# Patient Record
Sex: MALE | URBAN - METROPOLITAN AREA
[De-identification: names, ages, dates, MRNs, and addresses within clinical notes are randomized per-mention and may not be internally consistent; named-entity substitution may affect disease eponyms.]

---

## 2018-01-25 ENCOUNTER — IMPORTED ENCOUNTER (OUTPATIENT)
Dept: URBAN - METROPOLITAN AREA CLINIC 43 | Facility: CLINIC | Age: 68
End: 2018-01-25

## 2018-01-25 PROBLEM — H43.813: Noted: 2018-01-25

## 2018-01-25 PROBLEM — H25.013: Noted: 2018-01-25

## 2018-01-25 PROBLEM — H02.834: Noted: 2018-01-25

## 2018-01-25 PROBLEM — H25.13: Noted: 2018-01-25

## 2018-01-25 PROBLEM — H35.362: Noted: 2018-01-25

## 2018-01-25 PROBLEM — H02.831: Noted: 2018-01-25

## 2019-01-30 ENCOUNTER — IMPORTED ENCOUNTER (OUTPATIENT)
Dept: URBAN - METROPOLITAN AREA CLINIC 43 | Facility: CLINIC | Age: 69
End: 2019-01-30

## 2019-01-30 PROBLEM — H35.363: Noted: 2019-01-30

## 2019-01-30 PROBLEM — H25.013: Noted: 2019-01-30

## 2019-01-30 PROBLEM — H25.13: Noted: 2019-01-30

## 2019-01-30 PROBLEM — H16.223: Noted: 2019-01-30

## 2019-01-30 PROBLEM — H43.813: Noted: 2019-01-30

## 2020-01-30 ENCOUNTER — ESTABLISHED COMPREHENSIVE EXAM (OUTPATIENT)
Dept: URBAN - METROPOLITAN AREA CLINIC 32 | Facility: CLINIC | Age: 70
End: 2020-01-30

## 2020-01-30 ENCOUNTER — PREPPED CHART (OUTPATIENT)
Dept: URBAN - METROPOLITAN AREA CLINIC 32 | Facility: CLINIC | Age: 70
End: 2020-01-30

## 2020-01-30 DIAGNOSIS — H35.363: ICD-10-CM

## 2020-01-30 DIAGNOSIS — H16.223: ICD-10-CM

## 2020-01-30 DIAGNOSIS — H25.013: ICD-10-CM

## 2020-01-30 DIAGNOSIS — B07.9: ICD-10-CM

## 2020-01-30 PROCEDURE — 92014 COMPRE OPH EXAM EST PT 1/>: CPT

## 2020-01-30 PROCEDURE — 92134 CPTRZ OPH DX IMG PST SGM RTA: CPT

## 2020-01-30 ASSESSMENT — KERATOMETRY
OD_K2POWER_DIOPTERS: 41.5
OD_AXISANGLE2_DEGREES: 100
OS_AXISANGLE2_DEGREES: 179
OS_AXISANGLE_DEGREES: 89
OD_AXISANGLE_DEGREES: 10
OS_K1POWER_DIOPTERS: 47.25
OS_K2POWER_DIOPTERS: 46
OD_K1POWER_DIOPTERS: 45.25

## 2020-01-30 ASSESSMENT — VISUAL ACUITY
OD_SC: 20/60-1
OS_SC: J1
OS_CC: 20/25
OD_CC: 20/30+1
OS_SC: 20/60
OD_SC: J1

## 2020-01-30 ASSESSMENT — TONOMETRY
OS_IOP_MMHG: 13
OD_IOP_MMHG: 13

## 2020-04-19 ASSESSMENT — VISUAL ACUITY
OS_SC: J1
OS_CC: 20/30-2
OD_OTHER: 20/40.
OS_CC: J1+ SC
OD_SC: 20/50-
OD_CC: J1+ SC
OD_CC: 20/25-
OD_SC: J1
OS_SC: J1
OS_SC: 20/40
OD_SC: 20/40
OS_OTHER: 20/40.
OD_SC: J1
OD_CC: 20/20
OS_CC: 20/20
OS_SC: 20/60-

## 2020-04-19 ASSESSMENT — TONOMETRY
OD_IOP_MMHG: 15.0
OS_IOP_MMHG: 16.0
OD_IOP_MMHG: 12.0
OS_IOP_MMHG: 13.0

## 2020-04-19 ASSESSMENT — KERATOMETRY
OD_AXISANGLE_DEGREES: 80
OS_AXISANGLE_DEGREES: 85
OS_AXISANGLE2_DEGREES: 175
OD_K1POWER_DIOPTERS: 46.75
OD_K2POWER_DIOPTERS: 46
OD_AXISANGLE2_DEGREES: 170
OS_K2POWER_DIOPTERS: 46
OS_K1POWER_DIOPTERS: 46.75

## 2021-01-01 ENCOUNTER — LAB OUTSIDE AN ENCOUNTER (OUTPATIENT)
Dept: URBAN - METROPOLITAN AREA CLINIC 68 | Facility: CLINIC | Age: 71
End: 2021-01-01

## 2021-06-23 ENCOUNTER — OFFICE VISIT (OUTPATIENT)
Dept: URBAN - METROPOLITAN AREA CLINIC 68 | Facility: CLINIC | Age: 71
End: 2021-06-23

## 2021-08-16 NOTE — PATIENT DISCUSSION
Patients likes trial frame of todays MR compared to new and old lenses. New Rx give for glasses to be remade.

## 2022-06-04 ENCOUNTER — TELEPHONE ENCOUNTER (OUTPATIENT)
Dept: URBAN - METROPOLITAN AREA CLINIC 68 | Facility: CLINIC | Age: 72
End: 2022-06-04

## 2022-06-05 ENCOUNTER — TELEPHONE ENCOUNTER (OUTPATIENT)
Dept: URBAN - METROPOLITAN AREA CLINIC 68 | Facility: CLINIC | Age: 72
End: 2022-06-05

## 2022-06-05 RX ORDER — AMLODIPINE BESYLATE 5 MG/1
AMLODIPINE BESYLATE( 5MG ORAL   ) ACTIVE -HX ENTRY TABLET ORAL
Status: ACTIVE | COMMUNITY
Start: 2021-06-23

## 2022-06-05 RX ORDER — LISINOPRIL 40 MG/1
LISINOPRIL( 40MG ORAL   ) ACTIVE -HX ENTRY TABLET ORAL
Status: ACTIVE | COMMUNITY
Start: 2021-06-23

## 2022-06-05 RX ORDER — ALLOPURINOL 100 MG/1
ALLOPURINOL( 100MG ORAL 2   ) ACTIVE -HX ENTRY TABLET ORAL
Status: ACTIVE | COMMUNITY
Start: 2021-06-23

## 2022-06-25 ENCOUNTER — TELEPHONE ENCOUNTER (OUTPATIENT)
Age: 72
End: 2022-06-25

## 2022-06-26 ENCOUNTER — TELEPHONE ENCOUNTER (OUTPATIENT)
Age: 72
End: 2022-06-26

## 2022-06-26 RX ORDER — LISINOPRIL 40 MG/1
LISINOPRIL( 40MG ORAL   ) ACTIVE -HX ENTRY TABLET ORAL
Status: ACTIVE | COMMUNITY
Start: 2021-06-23

## 2022-06-26 RX ORDER — ALLOPURINOL 100 MG/1
ALLOPURINOL( 100MG ORAL 2   ) ACTIVE -HX ENTRY TABLET ORAL
Status: ACTIVE | COMMUNITY
Start: 2021-06-23

## 2022-06-26 RX ORDER — AMLODIPINE BESYLATE 5 MG/1
AMLODIPINE BESYLATE( 5MG ORAL   ) ACTIVE -HX ENTRY TABLET ORAL
Status: ACTIVE | COMMUNITY
Start: 2021-06-23

## 2022-11-15 NOTE — PATIENT DISCUSSION
Symptoms alleviated when blinking, especially at night. Would recommend warm compresses/[place] mask. Mostly evaporative dry eye.  Also recommended artificial tears to use: 1 drop 4x a day in both eyes.

## 2022-12-14 ENCOUNTER — COMPREHENSIVE EXAM (OUTPATIENT)
Dept: URBAN - METROPOLITAN AREA CLINIC 32 | Facility: CLINIC | Age: 72
End: 2022-12-14

## 2022-12-14 PROCEDURE — 92015 DETERMINE REFRACTIVE STATE: CPT

## 2022-12-14 PROCEDURE — 92014 COMPRE OPH EXAM EST PT 1/>: CPT

## 2022-12-14 ASSESSMENT — TONOMETRY
OS_IOP_MMHG: 16
OD_IOP_MMHG: 14

## 2022-12-14 ASSESSMENT — VISUAL ACUITY
OS_CC: 20/100-1
OS_PH: 20/50+1
OD_CC: 20/25-1
OS_SC: 20/300
OD_SC: J1+
OD_SC: 20/30-2
OD_GLARE: 20/60
OS_GLARE: 20/100
OS_SC: J1+

## 2022-12-14 ASSESSMENT — KERATOMETRY
OS_K2POWER_DIOPTERS: 46.25
OS_AXISANGLE2_DEGREES: 87
OD_AXISANGLE2_DEGREES: 82
OD_K2POWER_DIOPTERS: 46.25
OD_K1POWER_DIOPTERS: 47.00
OS_K1POWER_DIOPTERS: 46.75
OD_AXISANGLE_DEGREES: 172
OS_AXISANGLE_DEGREES: 177

## 2023-06-09 ENCOUNTER — FOLLOW UP (OUTPATIENT)
Dept: URBAN - METROPOLITAN AREA CLINIC 32 | Facility: CLINIC | Age: 73
End: 2023-06-09

## 2023-06-09 DIAGNOSIS — H18.422: ICD-10-CM

## 2023-06-09 DIAGNOSIS — H35.3121: ICD-10-CM

## 2023-06-09 DIAGNOSIS — H16.223: ICD-10-CM

## 2023-06-09 DIAGNOSIS — H43.813: ICD-10-CM

## 2023-06-09 DIAGNOSIS — H35.363: ICD-10-CM

## 2023-06-09 DIAGNOSIS — H25.813: ICD-10-CM

## 2023-06-09 PROCEDURE — 92015 DETERMINE REFRACTIVE STATE: CPT

## 2023-06-09 PROCEDURE — 92134 CPTRZ OPH DX IMG PST SGM RTA: CPT

## 2023-06-09 PROCEDURE — 99214 OFFICE O/P EST MOD 30 MIN: CPT

## 2023-06-09 ASSESSMENT — KERATOMETRY
OS_K2POWER_DIOPTERS: 45.75
OD_AXISANGLE2_DEGREES: 180
OS_AXISANGLE2_DEGREES: 81
OD_K2POWER_DIOPTERS: 46.00
OD_AXISANGLE_DEGREES: 090
OD_K1POWER_DIOPTERS: 46.75
OS_AXISANGLE_DEGREES: 171
OS_K1POWER_DIOPTERS: 46.75

## 2023-06-09 ASSESSMENT — VISUAL ACUITY
OD_SC: J3
OS_PH: 20/30
OS_SC: J3
OS_SC: 20/60
OD_SC: 20/60
OD_CC: 20/25-2
OS_CC: 20/40

## 2023-06-09 ASSESSMENT — TONOMETRY
OD_IOP_MMHG: 12
OS_IOP_MMHG: 14

## 2024-06-20 ENCOUNTER — COMPREHENSIVE EXAM (OUTPATIENT)
Dept: URBAN - METROPOLITAN AREA CLINIC 32 | Facility: CLINIC | Age: 74
End: 2024-06-20

## 2024-06-20 DIAGNOSIS — H25.813: ICD-10-CM

## 2024-06-20 DIAGNOSIS — H43.813: ICD-10-CM

## 2024-06-20 DIAGNOSIS — H18.422: ICD-10-CM

## 2024-06-20 DIAGNOSIS — H35.3111: ICD-10-CM

## 2024-06-20 DIAGNOSIS — H16.223: ICD-10-CM

## 2024-06-20 DIAGNOSIS — H35.3122: ICD-10-CM

## 2024-06-20 PROCEDURE — 92015 DETERMINE REFRACTIVE STATE: CPT

## 2024-06-20 PROCEDURE — 92134 CPTRZ OPH DX IMG PST SGM RTA: CPT

## 2024-06-20 PROCEDURE — 99214 OFFICE O/P EST MOD 30 MIN: CPT

## 2024-06-20 ASSESSMENT — VISUAL ACUITY
OS_SC: J1
OD_CC: 20/30-2
OD_SC: 20/60
OD_GLARE: 20/100
OD_SC: J2
OS_GLARE: 20/100
OS_PH: 20/25-2
OS_CC: 20/50
OS_SC: 20/80

## 2024-06-20 ASSESSMENT — KERATOMETRY
OS_K2POWER_DIOPTERS: 45.75
OD_AXISANGLE2_DEGREES: 49
OD_K1POWER_DIOPTERS: 46.75
OD_AXISANGLE_DEGREES: 090
OD_AXISANGLE2_DEGREES: 180
OD_AXISANGLE_DEGREES: 139
OD_K1POWER_DIOPTERS: 47.25
OS_K1POWER_DIOPTERS: 46.75
OD_K2POWER_DIOPTERS: 46.00
OD_K2POWER_DIOPTERS: 47.00
OS_AXISANGLE2_DEGREES: 81
OS_AXISANGLE_DEGREES: 171

## 2024-06-20 ASSESSMENT — TONOMETRY
OD_IOP_MMHG: 12
OS_IOP_MMHG: 15

## 2024-06-26 ENCOUNTER — DIAGNOSTICS ONLY (OUTPATIENT)
Dept: URBAN - METROPOLITAN AREA CLINIC 32 | Facility: CLINIC | Age: 74
End: 2024-06-26

## 2024-06-26 DIAGNOSIS — H57.03: ICD-10-CM

## 2024-06-26 DIAGNOSIS — H25.813: ICD-10-CM

## 2024-06-26 PROCEDURE — 92136 OPHTHALMIC BIOMETRY: CPT

## 2024-06-26 PROCEDURE — 99213 OFFICE O/P EST LOW 20 MIN: CPT

## 2024-06-26 PROCEDURE — V2799PM PRED MOXI

## 2024-06-26 ASSESSMENT — VISUAL ACUITY
OS_SC: 20/80
OD_GLARE: 20/100
OD_CC: J1+
OD_SC: 20/60
OS_CC: J1+
OD_SC: J2
OS_CC: 20/25
OD_CC: 20/20
OS_GLARE: 20/100
OS_SC: J1

## 2024-06-26 ASSESSMENT — KERATOMETRY
OD_K1POWER_DIOPTERS: 46.75
OD_AXISANGLE2_DEGREES: 49
OD_AXISANGLE2_DEGREES: 180
OD_AXISANGLE_DEGREES: 139
OD_K2POWER_DIOPTERS: 46.00
OD_K2POWER_DIOPTERS: 47.00
OS_AXISANGLE2_DEGREES: 81
OS_K2POWER_DIOPTERS: 45.75
OS_AXISANGLE_DEGREES: 171
OD_AXISANGLE_DEGREES: 090
OS_K1POWER_DIOPTERS: 46.75
OD_K1POWER_DIOPTERS: 47.25

## 2024-07-17 ENCOUNTER — SURGERY/PROCEDURE (OUTPATIENT)
Facility: LOCATION | Age: 74
End: 2024-07-17

## 2024-07-17 DIAGNOSIS — H25.812: ICD-10-CM

## 2024-07-17 PROCEDURE — 66984 XCAPSL CTRC RMVL W/O ECP: CPT

## 2024-07-18 ENCOUNTER — POST-OP (OUTPATIENT)
Dept: URBAN - METROPOLITAN AREA CLINIC 32 | Facility: CLINIC | Age: 74
End: 2024-07-18

## 2024-07-18 DIAGNOSIS — Z96.1: ICD-10-CM

## 2024-07-18 PROCEDURE — 99024 POSTOP FOLLOW-UP VISIT: CPT

## 2024-07-18 ASSESSMENT — KERATOMETRY
OD_K1POWER_DIOPTERS: 47.25
OD_K2POWER_DIOPTERS: 47.00
OS_AXISANGLE2_DEGREES: 81
OS_AXISANGLE_DEGREES: 171
OS_K2POWER_DIOPTERS: 45.75
OD_AXISANGLE_DEGREES: 139
OS_K1POWER_DIOPTERS: 46.75
OD_AXISANGLE2_DEGREES: 49

## 2024-07-18 ASSESSMENT — TONOMETRY: OS_IOP_MMHG: 15

## 2024-07-18 ASSESSMENT — VISUAL ACUITY
OD_SC: J2
OD_SC: 20/200

## 2024-07-22 ENCOUNTER — POST-OP (OUTPATIENT)
Dept: URBAN - METROPOLITAN AREA CLINIC 32 | Facility: CLINIC | Age: 74
End: 2024-07-22

## 2024-07-22 DIAGNOSIS — Z96.1: ICD-10-CM

## 2024-07-22 DIAGNOSIS — H57.03: ICD-10-CM

## 2024-07-22 DIAGNOSIS — H25.811: ICD-10-CM

## 2024-07-22 PROCEDURE — 99024 POSTOP FOLLOW-UP VISIT: CPT

## 2024-07-22 ASSESSMENT — VISUAL ACUITY
OS_SC: 20/100
OS_PH: 20/25-1
OS_SC: J1+

## 2024-07-22 ASSESSMENT — KERATOMETRY
OD_AXISANGLE_DEGREES: 139
OD_K1POWER_DIOPTERS: 47.25
OS_K2POWER_DIOPTERS: 45.75
OS_AXISANGLE2_DEGREES: 81
OD_AXISANGLE2_DEGREES: 49
OD_K2POWER_DIOPTERS: 47.00
OS_AXISANGLE_DEGREES: 171
OS_K1POWER_DIOPTERS: 46.75

## 2024-07-22 ASSESSMENT — TONOMETRY
OS_IOP_MMHG: 12
OD_IOP_MMHG: 12

## 2024-07-24 ASSESSMENT — KERATOMETRY
OD_K1POWER_DIOPTERS: 47.25
OD_AXISANGLE2_DEGREES: 180
OS_K2POWER_DIOPTERS: 45.75
OS_AXISANGLE_DEGREES: 171
OD_K1POWER_DIOPTERS: 46.75
OD_K2POWER_DIOPTERS: 46.00
OS_AXISANGLE2_DEGREES: 81
OD_AXISANGLE_DEGREES: 090
OD_AXISANGLE_DEGREES: 139
OD_K2POWER_DIOPTERS: 47.00
OD_AXISANGLE2_DEGREES: 49
OS_K1POWER_DIOPTERS: 46.75

## 2024-08-01 ENCOUNTER — POST-OP (OUTPATIENT)
Dept: URBAN - METROPOLITAN AREA CLINIC 32 | Facility: CLINIC | Age: 74
End: 2024-08-01

## 2024-08-01 DIAGNOSIS — Z96.1: ICD-10-CM

## 2024-08-01 PROCEDURE — 99024 POSTOP FOLLOW-UP VISIT: CPT

## 2024-08-01 ASSESSMENT — KERATOMETRY
OS_K1POWER_DIOPTERS: 46.75
OS_K2POWER_DIOPTERS: 45.75
OD_K1POWER_DIOPTERS: 47.25
OD_AXISANGLE_DEGREES: 139
OD_AXISANGLE2_DEGREES: 49
OS_AXISANGLE2_DEGREES: 81
OD_K2POWER_DIOPTERS: 47.00
OS_AXISANGLE_DEGREES: 171

## 2024-08-01 ASSESSMENT — TONOMETRY: OD_IOP_MMHG: 13

## 2024-08-01 ASSESSMENT — VISUAL ACUITY
OD_PH: 20/30-2
OD_SC: J1+2
OD_SC: 20/80+2

## 2024-08-07 ENCOUNTER — POST-OP (OUTPATIENT)
Dept: URBAN - METROPOLITAN AREA CLINIC 32 | Facility: CLINIC | Age: 74
End: 2024-08-07

## 2024-08-07 DIAGNOSIS — Z96.1: ICD-10-CM

## 2024-08-07 PROCEDURE — 99024 POSTOP FOLLOW-UP VISIT: CPT

## 2024-08-07 ASSESSMENT — KERATOMETRY
OS_AXISANGLE2_DEGREES: 81
OD_K1POWER_DIOPTERS: 47.25
OD_AXISANGLE_DEGREES: 160
OS_AXISANGLE2_DEGREES: 75
OD_K1POWER_DIOPTERS: 46.75
OS_AXISANGLE_DEGREES: 171
OD_K2POWER_DIOPTERS: 46.25
OD_AXISANGLE2_DEGREES: 70
OD_AXISANGLE2_DEGREES: 49
OS_AXISANGLE_DEGREES: 165
OD_K2POWER_DIOPTERS: 47.00
OD_AXISANGLE_DEGREES: 139
OS_K1POWER_DIOPTERS: 46.75
OS_K2POWER_DIOPTERS: 45.75

## 2024-08-07 ASSESSMENT — VISUAL ACUITY
OD_PH: 20/40
OS_SC: J1+
OD_SC: 20/60-1
OS_SC: 20/100+2
OD_SC: J1+/-2
OS_PH: 20/40

## 2024-08-07 ASSESSMENT — TONOMETRY
OD_IOP_MMHG: 11
OS_IOP_MMHG: 11

## 2024-09-04 ENCOUNTER — POST-OP (OUTPATIENT)
Dept: URBAN - METROPOLITAN AREA CLINIC 32 | Facility: CLINIC | Age: 74
End: 2024-09-04

## 2024-09-04 DIAGNOSIS — Z96.1: ICD-10-CM

## 2024-09-04 PROCEDURE — 99024 POSTOP FOLLOW-UP VISIT: CPT

## 2025-03-05 NOTE — PATIENT DISCUSSION
Monitor. Patient pre-assessment complete for [facility] scheduled for 930, arrival time 0830. Patient verified using . Patient instructed to bring a list of all home medications on the day of procedure. NPO status reinforced. Patient informed to take a full dose aspirin 325mg  or 81 mg x 4 on the day of procedure. Patient instructed to HOLD lasix and chlorthalidone. Instructed they can take all other medications excluding vitamins & supplements. Patient verbalizes understanding of all instructions & denies any questions at this time.

## 2025-07-07 ENCOUNTER — COMPREHENSIVE EXAM (OUTPATIENT)
Age: 75
End: 2025-07-07

## 2025-07-07 DIAGNOSIS — H26.493: ICD-10-CM

## 2025-07-07 DIAGNOSIS — H16.223: ICD-10-CM

## 2025-07-07 DIAGNOSIS — H35.3122: ICD-10-CM

## 2025-07-07 DIAGNOSIS — H35.3111: ICD-10-CM

## 2025-07-07 DIAGNOSIS — H43.813: ICD-10-CM

## 2025-07-07 DIAGNOSIS — H18.422: ICD-10-CM

## 2025-07-07 PROCEDURE — 99214 OFFICE O/P EST MOD 30 MIN: CPT

## 2025-07-07 PROCEDURE — 92015 DETERMINE REFRACTIVE STATE: CPT

## 2025-07-07 PROCEDURE — 92134 CPTRZ OPH DX IMG PST SGM RTA: CPT
